# Patient Record
Sex: MALE | Race: WHITE | Employment: OTHER | URBAN - METROPOLITAN AREA
[De-identification: names, ages, dates, MRNs, and addresses within clinical notes are randomized per-mention and may not be internally consistent; named-entity substitution may affect disease eponyms.]

---

## 2020-09-11 ENCOUNTER — HOSPITAL ENCOUNTER (OUTPATIENT)
Age: 65
Discharge: HOME | End: 2020-09-11
Payer: MEDICARE

## 2020-09-11 PROCEDURE — 84153 ASSAY OF PSA TOTAL: CPT

## 2020-09-11 PROCEDURE — 36415 COLL VENOUS BLD VENIPUNCTURE: CPT

## 2020-09-12 LAB — PROSTATE SPECIFIC ANTIGEN: 3.41 NG/ML (ref 0–4)

## 2020-09-28 ENCOUNTER — TELEPHONE (OUTPATIENT)
Dept: FAMILY MEDICINE CLINIC | Age: 65
End: 2020-09-28

## 2020-09-28 NOTE — TELEPHONE ENCOUNTER
Patients wife calling today stating patient has a horrible sinus infection and they are leaving for [place] in the morning. I advised express care and they said they didn't want to come into office d/t covid. I can not find any record or past office visit. Are you willing to call in script? Please advise.

## 2021-07-01 NOTE — TELEPHONE ENCOUNTER
Has not been seen since Dec 2016, wife advised patient needs seen, dr will not call in a script pregnant

## 2023-01-04 RX ORDER — MAGNESIUM 30 MG
30 TABLET ORAL DAILY
COMMUNITY

## 2023-01-04 RX ORDER — SACUBITRIL AND VALSARTAN 49; 51 MG/1; MG/1
1 TABLET, FILM COATED ORAL 2 TIMES DAILY
COMMUNITY

## 2023-01-04 RX ORDER — VITAMIN B COMPLEX
1 CAPSULE ORAL DAILY
COMMUNITY

## 2023-01-04 RX ORDER — EPLERENONE 25 MG/1
25 TABLET, FILM COATED ORAL DAILY
COMMUNITY

## 2023-01-04 RX ORDER — MULTIVIT WITH MINERALS/LUTEIN
250 TABLET ORAL DAILY
COMMUNITY

## 2023-01-04 NOTE — PROGRESS NOTES
[facility] PRE-ADMISSION TESTING INSTRUCTIONS    The Preadmission Testing patient is instructed accordingly using the following criteria (check applicable):    ARRIVAL INSTRUCTIONS:  [x] Parking the day of Surgery is located in the Main Entrance lot. Upon entering the door, make an immediate right to the surgery reception desk    [x] Bring photo ID and insurance card    [x] Bring in a copy of Living will or Durable Power of  papers. [x] Please be sure to arrange transportation to and from the hospital    [x] Please arrange for someone to be with you the remainder of the day due to having anesthesia      GENERAL INSTRUCTIONS:    [x] Nothing by mouth after midnight, including gum, candy, mints or water    [x] You may brush your teeth, but do not swallow any water    [x] Take medications as instructed with 1-2 oz of water    [x] Stop herbal supplements and vitamins 5 days prior to procedure    [x] Follow preop dosing of blood thinners per physician instructions    [] Do not take insulin or oral diabetic medications    [] If diabetic and have low blood sugar or feel symptomatic, take 1-2oz apple juice or glucose tablets    [] Bring inhalers day of surgery    [] Bring C-PAP/ Bi-Pap day of surgery    [] Bring urine specimen day of surgery    [x] Antibacterial Soap shower or bath AM of Surgery, no lotion, powders or creams to surgical site    [] Follow bowel prep as instructed per surgeon    [x] No tobacco products within 24 hours of surgery     [x] No alcohol or illegal drug use within 24 hours of surgery.     [x] Jewelry, body piercing's, eyeglasses, contact lenses and dentures are not permitted into surgery (bring cases)      [] Please do not wear any nail polish or make up on the day of surgery    [] If not already done, you can expect a call from registration    [x] If surgeon requests a time change you will be notified the day prior to surgery    [] If you receive a survey after surgery we would greatly appreciate your comments    [] Parent/guardian of a minor must accompany their child and remain on the premises  the entire time they are under our care     [] Pediatric patients may bring favorite toy, blanket or comfort item with them    [] A caregiver or family member must remain with the patient during their stay if they are mentally handicapped, have dementia, disoriented or unable to use a call light or would be a safety concern if left unattended    [x] Please notify surgeon if you develop any illness between now and time of surgery (cold, cough, sore throat, fever, nausea, vomiting) or any signs of infections  including skin, wounds, and dental.    [] Other instructions    EDUCATIONAL MATERIALS PROVIDED:    [] PAT Preoperative Education Packet/Booklet     [] Medication List    [] Fluoroscopy Information Pamphlet    [] Transfusion bracelet applied with instructions    [] Joint replacement video reviewed    [] Shower with antibacterial soap and use CHG wipes provided the evening before surgery as instructed

## 2023-01-05 NOTE — PROGRESS NOTES
MORTEZA Rowley R.N. reviewed with Dr. Ej Goodman on 1/4/23 pt cardiac history and that he follows with , cardiologist at [facility] his last visit was 3/2022. Dr Ej Goodman stated that Celester Host need to retreive the ov notes from 3/22 sp she could review, and if she could not get them, pt would need cardiac and medical clearance. .. Today, [facility] Ov notes were easily obtained from care everywhere and reviewed with Dr. Glenys Michel, Anesthesiologist who is requesting that pt get cardiac clearance for upcomigTURP surgery.  Arleth at Dr. Laverne Wynne office notified and will call pt and sent clearance request to cardiologist.

## 2023-02-02 RX ORDER — MULTIVIT-MIN/IRON/FOLIC ACID/K 18-600-40
CAPSULE ORAL
COMMUNITY

## 2023-02-02 NOTE — PROGRESS NOTES
[facility] PRE-ADMISSION TESTING INSTRUCTIONS    The Preadmission Testing patient is instructed accordingly using the following criteria (check applicable):    ARRIVAL INSTRUCTIONS:  [x] Parking the day of Surgery is located in the Main Entrance lot. Upon entering the door, make an immediate right to the surgery reception desk    [x] Bring photo ID and insurance card    [] Bring in a copy of Living will or Durable Power of  papers. [x] Please be sure to arrange for responsible adult to provide transportation to and from the hospital    [x] Please arrange for responsible adult to be with you for the 24 hour period post procedure due to having anesthesia    [x] If you awake am of surgery not feeling well or have temperature >100 please call [phone]    GENERAL INSTRUCTIONS:    [x] Nothing by mouth after midnight, including gum, candy, mints or water    [x] You may brush your teeth, but do not swallow any water    [x] Take medications as instructed with 1-2 oz of water    [x] Stop herbal supplements and vitamins 5 days prior to procedure    [] Follow preop dosing of blood thinners per physician instructions    [] Take 1/2 dose of evening insulin, but no insulin after midnight    [] No oral diabetic medications after midnight    [] If diabetic and have low blood sugar or feel symptomatic, take 1-2oz apple juice only    [] Bring inhalers day of surgery    [] Bring C-PAP/ Bi-Pap day of surgery    [] Bring urine specimen day of surgery    [x] Shower or bath with soap, lather and rinse well, AM of Surgery, no lotion, powders or creams to surgical site    [] Follow bowel prep as instructed per surgeon    [x] No tobacco products within 24 hours of surgery     [x] No alcohol or illegal drug use within 24 hours of surgery.     [x] Jewelry, body piercing's, eyeglasses, contact lenses and dentures are not permitted into surgery (bring cases)      [x] Please do not wear any nail polish, make up or hair products on the day of surgery    [x] You can expect a call the business day prior to procedure to notify you if your arrival time changes    [x] If you receive a survey after surgery we would greatly appreciate your comments    [] Parent/guardian of a minor must accompany their child and remain on the premises  the entire time they are under our care     [] Pediatric patients may bring favorite toy, blanket or comfort item with them    [] A caregiver or family member must remain with the patient during their stay if they are mentally handicapped, have dementia, disoriented or unable to use a call light or would be a safety concern if left unattended    [x] Please notify surgeon if you develop any illness between now and time of surgery (cold, cough, sore throat, fever, nausea, vomiting) or any signs of infections  including skin, wounds, and dental.    [x]  The Outpatient Pharmacy is available to fill your prescription here on your day of surgery, ask your preop nurse for details    [] Other instructions    EDUCATIONAL MATERIALS PROVIDED:    [] PAT Preoperative Education Packet/Booklet     [] Medication List    [] Transfusion bracelet applied with instructions    [] Shower with soap, lather and rinse well, and use CHG wipes provided the evening before surgery as instructed    [] Incentive spirometer with instructions

## 2023-02-02 NOTE — PROGRESS NOTES
Cardiac clearance and office notes not on chart. [facility] at Dr. Norberto Tavarez office notified.

## 2023-02-03 ENCOUNTER — PREP FOR PROCEDURE (OUTPATIENT)
Dept: UROLOGY | Age: 68
End: 2023-02-03

## 2023-02-03 DIAGNOSIS — Z90.79 S/P TURP: Primary | ICD-10-CM

## 2023-02-03 RX ORDER — SODIUM CHLORIDE 0.9 % (FLUSH) 0.9 %
5-40 SYRINGE (ML) INJECTION PRN
Status: CANCELLED | OUTPATIENT
Start: 2023-02-03

## 2023-02-03 RX ORDER — SODIUM CHLORIDE 9 MG/ML
INJECTION, SOLUTION INTRAVENOUS CONTINUOUS
Status: CANCELLED | OUTPATIENT
Start: 2023-02-03

## 2023-02-03 RX ORDER — SODIUM CHLORIDE 9 MG/ML
INJECTION, SOLUTION INTRAVENOUS PRN
Status: CANCELLED | OUTPATIENT
Start: 2023-02-03

## 2023-02-03 RX ORDER — SODIUM CHLORIDE 0.9 % (FLUSH) 0.9 %
5-40 SYRINGE (ML) INJECTION EVERY 12 HOURS SCHEDULED
Status: CANCELLED | OUTPATIENT
Start: 2023-02-03

## 2023-02-03 NOTE — PROGRESS NOTES
Left voice message for Arleth: still need the cardiac clearance as requested on 1/5/2023 by Sarah Reyes RN for Dr Miriam Bridges, Anesthesiologist. (See Progress note)

## 2023-02-05 ENCOUNTER — ANESTHESIA EVENT (OUTPATIENT)
Dept: OPERATING ROOM | Age: 68
End: 2023-02-05
Payer: MEDICARE

## 2023-02-06 ENCOUNTER — HOSPITAL ENCOUNTER (EMERGENCY)
Age: 68
Discharge: HOME | End: 2023-02-06
Attending: EMERGENCY MEDICINE
Payer: MEDICARE

## 2023-02-06 ENCOUNTER — ANESTHESIA (OUTPATIENT)
Dept: OPERATING ROOM | Age: 68
End: 2023-02-06
Payer: MEDICARE

## 2023-02-06 ENCOUNTER — APPOINTMENT (OUTPATIENT)
Dept: GENERAL RADIOLOGY | Age: 68
End: 2023-02-06
Attending: UROLOGY
Payer: MEDICARE

## 2023-02-06 ENCOUNTER — HOSPITAL ENCOUNTER (OUTPATIENT)
Age: 68
Setting detail: OUTPATIENT SURGERY
Discharge: HOME | End: 2023-02-06
Attending: UROLOGY | Admitting: UROLOGY
Payer: MEDICARE

## 2023-02-06 VITALS
DIASTOLIC BLOOD PRESSURE: 88 MMHG | WEIGHT: 180 LBS | OXYGEN SATURATION: 100 % | HEIGHT: 69 IN | RESPIRATION RATE: 16 BRPM | HEART RATE: 78 BPM | BODY MASS INDEX: 26.66 KG/M2 | SYSTOLIC BLOOD PRESSURE: 125 MMHG | TEMPERATURE: 98.4 F

## 2023-02-06 VITALS
SYSTOLIC BLOOD PRESSURE: 128 MMHG | HEIGHT: 69 IN | WEIGHT: 180 LBS | HEART RATE: 72 BPM | OXYGEN SATURATION: 96 % | RESPIRATION RATE: 16 BRPM | TEMPERATURE: 97.5 F | BODY MASS INDEX: 26.66 KG/M2 | DIASTOLIC BLOOD PRESSURE: 74 MMHG

## 2023-02-06 DIAGNOSIS — G89.18 POST-OPERATIVE PAIN: Primary | ICD-10-CM

## 2023-02-06 DIAGNOSIS — Z90.79 S/P TURP (STATUS POST TRANSURETHRAL RESECTION OF PROSTATE): Primary | ICD-10-CM

## 2023-02-06 DIAGNOSIS — N13.8 ENLARGED PROSTATE WITH URINARY OBSTRUCTION: ICD-10-CM

## 2023-02-06 DIAGNOSIS — N40.1 ENLARGED PROSTATE WITH URINARY OBSTRUCTION: ICD-10-CM

## 2023-02-06 LAB
ANION GAP SERPL CALCULATED.3IONS-SCNC: 10 MMOL/L (ref 7–16)
BUN BLDV-MCNC: 22 MG/DL (ref 6–23)
CALCIUM SERPL-MCNC: 9.4 MG/DL (ref 8.6–10.2)
CHLORIDE BLD-SCNC: 104 MMOL/L (ref 98–107)
CO2: 25 MMOL/L (ref 22–29)
CREAT SERPL-MCNC: 1.1 MG/DL (ref 0.7–1.2)
EKG ATRIAL RATE: 62 BPM
EKG P AXIS: 63 DEGREES
EKG P-R INTERVAL: 148 MS
EKG Q-T INTERVAL: 424 MS
EKG QRS DURATION: 126 MS
EKG QTC CALCULATION (BAZETT): 430 MS
EKG R AXIS: -61 DEGREES
EKG T AXIS: 0 DEGREES
EKG VENTRICULAR RATE: 62 BPM
GFR SERPL CREATININE-BSD FRML MDRD: >60 ML/MIN/1.73
GLUCOSE BLD-MCNC: 104 MG/DL (ref 74–99)
HCT VFR BLD CALC: 49.4 % (ref 37–54)
HEMOGLOBIN: 16.4 G/DL (ref 12.5–16.5)
MCH RBC QN AUTO: 32.3 PG (ref 26–35)
MCHC RBC AUTO-ENTMCNC: 33.2 % (ref 32–34.5)
MCV RBC AUTO: 97.2 FL (ref 80–99.9)
PDW BLD-RTO: 13.8 FL (ref 11.5–15)
PLATELET # BLD: 198 E9/L (ref 130–450)
PMV BLD AUTO: 9.8 FL (ref 7–12)
POTASSIUM SERPL-SCNC: 4.1 MMOL/L (ref 3.5–5)
RBC # BLD: 5.08 E12/L (ref 3.8–5.8)
SODIUM BLD-SCNC: 139 MMOL/L (ref 132–146)
WBC # BLD: 7.8 E9/L (ref 4.5–11.5)

## 2023-02-06 PROCEDURE — 7100000010 HC PHASE II RECOVERY - FIRST 15 MIN: Performed by: UROLOGY

## 2023-02-06 PROCEDURE — 6360000002 HC RX W HCPCS: Performed by: ANESTHESIOLOGY

## 2023-02-06 PROCEDURE — 7100000001 HC PACU RECOVERY - ADDTL 15 MIN: Performed by: UROLOGY

## 2023-02-06 PROCEDURE — 3600000013 HC SURGERY LEVEL 3 ADDTL 15MIN: Performed by: UROLOGY

## 2023-02-06 PROCEDURE — 2580000003 HC RX 258: Performed by: NURSE PRACTITIONER

## 2023-02-06 PROCEDURE — 80048 BASIC METABOLIC PNL TOTAL CA: CPT

## 2023-02-06 PROCEDURE — 6360000002 HC RX W HCPCS

## 2023-02-06 PROCEDURE — 6360000002 HC RX W HCPCS: Performed by: NURSE ANESTHETIST, CERTIFIED REGISTERED

## 2023-02-06 PROCEDURE — 36415 COLL VENOUS BLD VENIPUNCTURE: CPT

## 2023-02-06 PROCEDURE — 6360000002 HC RX W HCPCS: Performed by: NURSE PRACTITIONER

## 2023-02-06 PROCEDURE — 6370000000 HC RX 637 (ALT 250 FOR IP)

## 2023-02-06 PROCEDURE — 88305 TISSUE EXAM BY PATHOLOGIST: CPT

## 2023-02-06 PROCEDURE — 74420 UROGRAPHY RTRGR +-KUB: CPT

## 2023-02-06 PROCEDURE — 7100000000 HC PACU RECOVERY - FIRST 15 MIN: Performed by: UROLOGY

## 2023-02-06 PROCEDURE — 7100000011 HC PHASE II RECOVERY - ADDTL 15 MIN: Performed by: UROLOGY

## 2023-02-06 PROCEDURE — 3600000003 HC SURGERY LEVEL 3 BASE: Performed by: UROLOGY

## 2023-02-06 PROCEDURE — 88342 IMHCHEM/IMCYTCHM 1ST ANTB: CPT

## 2023-02-06 PROCEDURE — 2720000010 HC SURG SUPPLY STERILE: Performed by: UROLOGY

## 2023-02-06 PROCEDURE — 2709999900 HC NON-CHARGEABLE SUPPLY: Performed by: UROLOGY

## 2023-02-06 PROCEDURE — C1758 CATHETER, URETERAL: HCPCS | Performed by: UROLOGY

## 2023-02-06 PROCEDURE — 2500000003 HC RX 250 WO HCPCS: Performed by: NURSE ANESTHETIST, CERTIFIED REGISTERED

## 2023-02-06 PROCEDURE — 93005 ELECTROCARDIOGRAM TRACING: CPT

## 2023-02-06 PROCEDURE — 3700000000 HC ANESTHESIA ATTENDED CARE: Performed by: UROLOGY

## 2023-02-06 PROCEDURE — 6360000004 HC RX CONTRAST MEDICATION: Performed by: UROLOGY

## 2023-02-06 PROCEDURE — 3700000001 HC ADD 15 MINUTES (ANESTHESIA): Performed by: UROLOGY

## 2023-02-06 PROCEDURE — 85027 COMPLETE CBC AUTOMATED: CPT

## 2023-02-06 PROCEDURE — 6370000000 HC RX 637 (ALT 250 FOR IP): Performed by: ANESTHESIOLOGY

## 2023-02-06 RX ORDER — SODIUM CHLORIDE 0.9 % (FLUSH) 0.9 %
5-40 SYRINGE (ML) INJECTION PRN
Status: DISCONTINUED | OUTPATIENT
Start: 2023-02-06 | End: 2023-02-06 | Stop reason: HOSPADM

## 2023-02-06 RX ORDER — FENTANYL CITRATE 50 UG/ML
INJECTION, SOLUTION INTRAMUSCULAR; INTRAVENOUS PRN
Status: DISCONTINUED | OUTPATIENT
Start: 2023-02-06 | End: 2023-02-06 | Stop reason: SDUPTHER

## 2023-02-06 RX ORDER — CEPHALEXIN 250 MG/1
250 CAPSULE ORAL 3 TIMES DAILY
Qty: 9 CAPSULE | Refills: 0 | Status: SHIPPED | OUTPATIENT
Start: 2023-02-06 | End: 2023-02-09

## 2023-02-06 RX ORDER — SODIUM CHLORIDE 9 MG/ML
INJECTION, SOLUTION INTRAVENOUS PRN
Status: DISCONTINUED | OUTPATIENT
Start: 2023-02-06 | End: 2023-02-06 | Stop reason: HOSPADM

## 2023-02-06 RX ORDER — SODIUM CHLORIDE 0.9 % (FLUSH) 0.9 %
5-40 SYRINGE (ML) INJECTION EVERY 12 HOURS SCHEDULED
Status: DISCONTINUED | OUTPATIENT
Start: 2023-02-06 | End: 2023-02-06 | Stop reason: HOSPADM

## 2023-02-06 RX ORDER — GLYCOPYRROLATE 0.2 MG/ML
INJECTION INTRAMUSCULAR; INTRAVENOUS PRN
Status: DISCONTINUED | OUTPATIENT
Start: 2023-02-06 | End: 2023-02-06 | Stop reason: SDUPTHER

## 2023-02-06 RX ORDER — ACETAMINOPHEN AND CODEINE PHOSPHATE 300; 30 MG/1; MG/1
1 TABLET ORAL ONCE
Status: COMPLETED | OUTPATIENT
Start: 2023-02-06 | End: 2023-02-06

## 2023-02-06 RX ORDER — SENNA PLUS 8.6 MG/1
1 TABLET ORAL 2 TIMES DAILY
Qty: 20 TABLET | Refills: 0 | Status: SHIPPED | OUTPATIENT
Start: 2023-02-06 | End: 2023-02-16

## 2023-02-06 RX ORDER — PROPOFOL 10 MG/ML
INJECTION, EMULSION INTRAVENOUS PRN
Status: DISCONTINUED | OUTPATIENT
Start: 2023-02-06 | End: 2023-02-06 | Stop reason: SDUPTHER

## 2023-02-06 RX ORDER — MEPERIDINE HYDROCHLORIDE 25 MG/ML
12.5 INJECTION INTRAMUSCULAR; INTRAVENOUS; SUBCUTANEOUS ONCE
Status: COMPLETED | OUTPATIENT
Start: 2023-02-06 | End: 2023-02-06

## 2023-02-06 RX ORDER — LIDOCAINE HYDROCHLORIDE 20 MG/ML
INJECTION, SOLUTION EPIDURAL; INFILTRATION; INTRACAUDAL; PERINEURAL PRN
Status: DISCONTINUED | OUTPATIENT
Start: 2023-02-06 | End: 2023-02-06 | Stop reason: SDUPTHER

## 2023-02-06 RX ORDER — HYDROCODONE BITARTRATE AND ACETAMINOPHEN 5; 325 MG/1; MG/1
1 TABLET ORAL ONCE
Status: COMPLETED | OUTPATIENT
Start: 2023-02-06 | End: 2023-02-06

## 2023-02-06 RX ORDER — ESMOLOL HYDROCHLORIDE 10 MG/ML
INJECTION INTRAVENOUS PRN
Status: DISCONTINUED | OUTPATIENT
Start: 2023-02-06 | End: 2023-02-06 | Stop reason: SDUPTHER

## 2023-02-06 RX ORDER — METOCLOPRAMIDE HYDROCHLORIDE 5 MG/ML
10 INJECTION INTRAMUSCULAR; INTRAVENOUS ONCE
Status: DISCONTINUED | OUTPATIENT
Start: 2023-02-06 | End: 2023-02-06 | Stop reason: HOSPADM

## 2023-02-06 RX ORDER — LABETALOL HYDROCHLORIDE 5 MG/ML
5 INJECTION, SOLUTION INTRAVENOUS
Status: DISCONTINUED | OUTPATIENT
Start: 2023-02-06 | End: 2023-02-06 | Stop reason: HOSPADM

## 2023-02-06 RX ORDER — NEOSTIGMINE METHYLSULFATE 1 MG/ML
INJECTION, SOLUTION INTRAVENOUS PRN
Status: DISCONTINUED | OUTPATIENT
Start: 2023-02-06 | End: 2023-02-06 | Stop reason: SDUPTHER

## 2023-02-06 RX ORDER — HYDROCODONE BITARTRATE AND ACETAMINOPHEN 5; 325 MG/1; MG/1
1-2 TABLET ORAL EVERY 6 HOURS PRN
Qty: 10 TABLET | Refills: 0 | Status: SHIPPED | OUTPATIENT
Start: 2023-02-06 | End: 2023-02-09

## 2023-02-06 RX ORDER — HYDRALAZINE HYDROCHLORIDE 20 MG/ML
5 INJECTION INTRAMUSCULAR; INTRAVENOUS
Status: DISCONTINUED | OUTPATIENT
Start: 2023-02-06 | End: 2023-02-06 | Stop reason: HOSPADM

## 2023-02-06 RX ORDER — DEXAMETHASONE SODIUM PHOSPHATE 4 MG/ML
INJECTION, SOLUTION INTRA-ARTICULAR; INTRALESIONAL; INTRAMUSCULAR; INTRAVENOUS; SOFT TISSUE PRN
Status: DISCONTINUED | OUTPATIENT
Start: 2023-02-06 | End: 2023-02-06 | Stop reason: SDUPTHER

## 2023-02-06 RX ORDER — PROCHLORPERAZINE EDISYLATE 5 MG/ML
5 INJECTION INTRAMUSCULAR; INTRAVENOUS
Status: DISCONTINUED | OUTPATIENT
Start: 2023-02-06 | End: 2023-02-06 | Stop reason: HOSPADM

## 2023-02-06 RX ORDER — ONDANSETRON 2 MG/ML
INJECTION INTRAMUSCULAR; INTRAVENOUS PRN
Status: DISCONTINUED | OUTPATIENT
Start: 2023-02-06 | End: 2023-02-06 | Stop reason: SDUPTHER

## 2023-02-06 RX ORDER — PYRAZINAMIDE 500 MG/1
1-2 TABLET ORAL EVERY 6 HOURS PRN
Qty: 10 TABLET | Refills: 0 | Status: SHIPPED | OUTPATIENT
Start: 2023-02-06 | End: 2023-02-13

## 2023-02-06 RX ORDER — ROCURONIUM BROMIDE 10 MG/ML
INJECTION, SOLUTION INTRAVENOUS PRN
Status: DISCONTINUED | OUTPATIENT
Start: 2023-02-06 | End: 2023-02-06 | Stop reason: SDUPTHER

## 2023-02-06 RX ORDER — DIPHENHYDRAMINE HYDROCHLORIDE 50 MG/ML
12.5 INJECTION INTRAMUSCULAR; INTRAVENOUS
Status: DISCONTINUED | OUTPATIENT
Start: 2023-02-06 | End: 2023-02-06 | Stop reason: HOSPADM

## 2023-02-06 RX ORDER — MIDAZOLAM HYDROCHLORIDE 1 MG/ML
INJECTION INTRAMUSCULAR; INTRAVENOUS PRN
Status: DISCONTINUED | OUTPATIENT
Start: 2023-02-06 | End: 2023-02-06 | Stop reason: SDUPTHER

## 2023-02-06 RX ORDER — SODIUM CHLORIDE 9 MG/ML
INJECTION, SOLUTION INTRAVENOUS CONTINUOUS
Status: DISCONTINUED | OUTPATIENT
Start: 2023-02-06 | End: 2023-02-06 | Stop reason: HOSPADM

## 2023-02-06 RX ORDER — FENTANYL CITRATE 50 UG/ML
25 INJECTION, SOLUTION INTRAMUSCULAR; INTRAVENOUS EVERY 5 MIN PRN
Status: DISCONTINUED | OUTPATIENT
Start: 2023-02-06 | End: 2023-02-06 | Stop reason: HOSPADM

## 2023-02-06 RX ADMIN — MIDAZOLAM 2 MG: 1 INJECTION INTRAMUSCULAR; INTRAVENOUS at 06:57

## 2023-02-06 RX ADMIN — HYDROMORPHONE HYDROCHLORIDE 0.5 MG: 1 INJECTION, SOLUTION INTRAMUSCULAR; INTRAVENOUS; SUBCUTANEOUS at 09:42

## 2023-02-06 RX ADMIN — PHENYLEPHRINE HYDROCHLORIDE 100 MCG: 10 INJECTION INTRAVENOUS at 07:40

## 2023-02-06 RX ADMIN — PROPOFOL 40 MG: 10 INJECTION, EMULSION INTRAVENOUS at 08:35

## 2023-02-06 RX ADMIN — GLYCOPYRROLATE 0.6 MG: 0.2 INJECTION, SOLUTION INTRAMUSCULAR; INTRAVENOUS at 08:49

## 2023-02-06 RX ADMIN — ROCURONIUM BROMIDE 40 MG: 50 INJECTION INTRAVENOUS at 07:08

## 2023-02-06 RX ADMIN — PHENYLEPHRINE HYDROCHLORIDE 100 MCG: 10 INJECTION INTRAVENOUS at 07:28

## 2023-02-06 RX ADMIN — NEOSTIGMINE METHYLSULFATE 3 MG: 1 INJECTION, SOLUTION INTRAVENOUS at 08:49

## 2023-02-06 RX ADMIN — FENTANYL CITRATE 100 MCG: 50 INJECTION, SOLUTION INTRAMUSCULAR; INTRAVENOUS at 07:08

## 2023-02-06 RX ADMIN — ESMOLOL HYDROCHLORIDE 20 MG: 10 INJECTION, SOLUTION INTRAVENOUS at 07:08

## 2023-02-06 RX ADMIN — SODIUM CHLORIDE: 9 INJECTION, SOLUTION INTRAVENOUS at 06:56

## 2023-02-06 RX ADMIN — PROPOFOL 80 MG: 10 INJECTION, EMULSION INTRAVENOUS at 07:08

## 2023-02-06 RX ADMIN — PHENYLEPHRINE HYDROCHLORIDE 100 MCG: 10 INJECTION INTRAVENOUS at 07:15

## 2023-02-06 RX ADMIN — PHENYLEPHRINE HYDROCHLORIDE 100 MCG: 10 INJECTION INTRAVENOUS at 08:50

## 2023-02-06 RX ADMIN — WATER 2000 MG: 1 INJECTION INTRAMUSCULAR; INTRAVENOUS; SUBCUTANEOUS at 07:14

## 2023-02-06 RX ADMIN — HYDROCODONE BITARTRATE AND ACETAMINOPHEN 1 TABLET: 5; 325 TABLET ORAL at 19:50

## 2023-02-06 RX ADMIN — ONDANSETRON 4 MG: 2 INJECTION INTRAMUSCULAR; INTRAVENOUS at 08:42

## 2023-02-06 RX ADMIN — LIDOCAINE HYDROCHLORIDE 100 MG: 20 INJECTION, SOLUTION EPIDURAL; INFILTRATION; INTRACAUDAL; PERINEURAL at 07:08

## 2023-02-06 RX ADMIN — GLYCOPYRROLATE 0.2 MG: 0.2 INJECTION, SOLUTION INTRAMUSCULAR; INTRAVENOUS at 07:04

## 2023-02-06 RX ADMIN — MEPERIDINE HYDROCHLORIDE 12.5 MG: 25 INJECTION, SOLUTION INTRAMUSCULAR; INTRAVENOUS; SUBCUTANEOUS at 09:41

## 2023-02-06 RX ADMIN — ACETAMINOPHEN AND CODEINE PHOSPHATE 1 TABLET: 300; 30 TABLET ORAL at 10:53

## 2023-02-06 RX ADMIN — DEXAMETHASONE SODIUM PHOSPHATE 10 MG: 4 INJECTION, SOLUTION INTRAMUSCULAR; INTRAVENOUS at 07:17

## 2023-02-06 ASSESSMENT — PAIN DESCRIPTION - LOCATION
LOCATION: ABDOMEN;GROIN
LOCATION: ABDOMEN
LOCATION: PENIS
LOCATION: ABDOMEN

## 2023-02-06 ASSESSMENT — PAIN DESCRIPTION - ORIENTATION
ORIENTATION: RIGHT
ORIENTATION: ANTERIOR

## 2023-02-06 ASSESSMENT — PAIN DESCRIPTION - DESCRIPTORS
DESCRIPTORS: PRESSURE
DESCRIPTORS: PRESSURE

## 2023-02-06 ASSESSMENT — PAIN DESCRIPTION - FREQUENCY: FREQUENCY: CONTINUOUS

## 2023-02-06 ASSESSMENT — PAIN DESCRIPTION - PAIN TYPE: TYPE: ACUTE PAIN

## 2023-02-06 ASSESSMENT — PAIN - FUNCTIONAL ASSESSMENT
PAIN_FUNCTIONAL_ASSESSMENT: 0-10
PAIN_FUNCTIONAL_ASSESSMENT: 0-10
PAIN_FUNCTIONAL_ASSESSMENT: PREVENTS OR INTERFERES SOME ACTIVE ACTIVITIES AND ADLS

## 2023-02-06 ASSESSMENT — PAIN SCALES - GENERAL
PAINLEVEL_OUTOF10: 9
PAINLEVEL_OUTOF10: 5
PAINLEVEL_OUTOF10: 7
PAINLEVEL_OUTOF10: 7

## 2023-02-06 ASSESSMENT — PAIN DESCRIPTION - ONSET: ONSET: ON-GOING

## 2023-02-06 ASSESSMENT — LIFESTYLE VARIABLES: SMOKING_STATUS: 0

## 2023-02-06 NOTE — ANESTHESIA PRE PROCEDURE
Department of Anesthesiology  Preprocedure Note       Name:  Annamarie Barker   Age:  79 y.o.  :  1955                                          MRN:  [id number]         Date:  2023      Surgeon: Martell Hart):  Bety Hawkins MD    Procedure: Procedure(s):  CYSTOSCOPY RETROGRADE PYELOGRAM PLASMA LOOP TRANURETHERAL RESECTION OF PROSTATE    Medications prior to admission:   Prior to Admission medications    Medication Sig Start Date End Date Taking? Authorizing Provider   Cholecalciferol (VITAMIN D) 50 MCG (2000) CAPS capsule Take by mouth   Yes Historical Provider, MD   sacubitril-valsartan (ENTRESTO) 49-51 MG per tablet Take 1 tablet by mouth 2 times daily   Yes Historical Provider, MD   b complex vitamins capsule Take 1 capsule by mouth daily   Yes Historical Provider, MD   eplerenone (INSPRA) 25 MG tablet Take 25 mg by mouth daily DIURETIC   Yes Historical Provider, MD   Ascorbic Acid (VITAMIN C) 250 MG tablet Take 250 mg by mouth daily   Yes Historical Provider, MD   [facility] ([address] [place] [address]) Take by mouth   Yes Historical Provider, MD   magnesium 30 MG tablet Take 30 mg by mouth daily   Yes Historical Provider, MD   Nutritional Supplements (CHOLESTEROL DEFENSE PO) Take by mouth daily   Yes Historical Provider, MD   COD LIVER OIL PO Take by mouth daily   Yes Historical Provider, MD   metoprolol succinate (TOPROL XL) 50 MG extended release tablet Take 1 tablet by mouth 2 times daily  Patient taking differently: Take 100 mg by mouth in the morning and 100 mg in the evening.  17   Radha Gannon MD       Current medications:    Current Facility-Administered Medications   Medication Dose Route Frequency Provider Last Rate Last Admin    0.9 % sodium chloride infusion   IntraVENous Continuous MARY LOU Gregorio CNP        sodium chloride flush 0.9 % injection 5-40 mL  5-40 mL IntraVENous 2 times per day MARY LOU Gregorio CNP        sodium chloride flush 0.9 % injection 5-40 mL  5-40 mL IntraVENous PRN MARY LOU Gregorio CNP        0.9 % sodium chloride infusion   IntraVENous PRN MARY LOU Gregorio CNP        ceFAZolin (ANCEF) 2,000 mg in sterile water 20 mL IV syringe  2,000 mg IntraVENous On Call to [address], APRN - CNP           Allergies: Allergies   Allergen Reactions    Atorvastatin Myalgia    Lisinopril Cough    Spironolactone        Problem List:  There is no problem list on file for this patient. Past Medical History:        Diagnosis Date    Asthma     [facility]     CHF (congestive heart failure) ([facility] 2017     [facility]    Hypertension     Restricted diet     KOSHER       Past Surgical History:        Procedure Laterality Date    COLONOSCOPY      HERNIA REPAIR         Social History:    Social History     Tobacco Use    Smoking status: Former     Types: Cigarettes    Smokeless tobacco: Never   Substance Use Topics    Alcohol use: Yes     Comment: occasionally                                Counseling given: Not Answered      Vital Signs (Current):   Vitals:    01/04/23 1435 02/02/23 1445 02/06/23 0528   BP:   139/84   Pulse:   60   Resp:   16   Temp:   96.8 °F (36 °C)   TempSrc:   Temporal   SpO2:   97%   Weight: 180 lb (81.6 kg) 180 lb (81.6 kg)    Height: 5' 9\" (1.753 m) 5' 9\" (1.753 m)                                               BP Readings from Last 3 Encounters:   02/06/23 139/84   03/22/17 122/82   02/15/17 116/80       NPO Status: Time of last liquid consumption: 2200                        Time of last solid consumption: 1830                        Date of last liquid consumption: 02/05/23                        Date of last solid food consumption: 02/05/23    BMI:   Wt Readings from Last 3 Encounters:   02/02/23 180 lb (81.6 kg)   03/22/17 188 lb (85.3 kg)   02/15/17 180 lb (81.6 kg)     Body mass index is 26.58 kg/m².     CBC:   Lab Results   Component Value Date/Time    WBC 7.8 02/06/2023 05:35 AM    RBC 5.08 02/06/2023 05:35 AM    HGB 16.4 02/06/2023 05:35 AM    HCT 49.4 02/06/2023 05:35 AM    MCV 97.2 02/06/2023 05:35 AM    RDW 13.8 02/06/2023 05:35 AM     02/06/2023 05:35 AM       CMP:   Lab Results   Component Value Date/Time     02/06/2023 05:35 AM    K 4.1 02/06/2023 05:35 AM     02/06/2023 05:35 AM    CO2 25 02/06/2023 05:35 AM    BUN 22 02/06/2023 05:35 AM    CREATININE 1.1 02/06/2023 05:35 AM    LABGLOM >60 02/06/2023 05:35 AM    GLUCOSE 104 02/06/2023 05:35 AM    GLUCOSE 128 02/19/2011 05:00 AM    CALCIUM 9.4 02/06/2023 05:35 AM       POC Tests: No results for input(s): POCGLU, POCNA, POCK, POCCL, POCBUN, POCHEMO, POCHCT in the last 72 hours.     Coags: No results found for: PROTIME, INR, APTT    HCG (If Applicable): No results found for: PREGTESTUR, PREGSERUM, HCG, HCGQUANT     ABGs: No results found for: PHART, PO2ART, UVO6ROO, ECK2YIF, BEART, V9TCCLCA     Type & Screen (If Applicable):  No results found for: LABABO, LABRH    Drug/Infectious Status (If Applicable):  No results found for: HIV, HEPCAB    COVID-19 Screening (If Applicable): No results found for: COVID19        Anesthesia Evaluation  Patient summary reviewed and Nursing notes reviewed no history of anesthetic complications:   Airway:           Dental:          Pulmonary:   (+) asthma (Mild intermittent):     (-) not a current smoker (Former)                           Cardiovascular:  Exercise tolerance: good (>4 METS),   (+) hypertension: no interval change and moderate, valvular problems/murmurs: MR and AI, CAD (LVEF 20-25%):, CHF:, pulmonary hypertension (RVSP 78 mm Hg): severe,       ECG reviewed      Echocardiogram reviewed         Beta Blocker:  Dose within 24 Hrs      ROS comment: EKG:  Normal sinus rhythm  Left axis deviation  Nonspecific intraventricular block  Abnormal ECG  No previous ECGs available    ECHO 2017:  Ejection fraction is visually estimated at 20-25%. Overall ejection fraction severely decreased. Left ventricle is mildly enlarged. There is doppler evidence of stage III diastolic dysfunction. Normal right ventricle size. Normal RV function. TAPSE is 1.7 cm. Left atrial volume index of 35 ml per meters squared BSA. Trace aortic valve regurgitation. Moderate mitral regurgitation is present. ERO is 0.2 cm2. Mild tricuspid regurgitation. Pulmonary hypertension is severe. RVSP is 78 mmHg. Per cardiology note 2017: This 66-year-old man has a history of nonischemic dilated cardiomyopathy. He had a LifeVest applied in mid February and he is undergoing upward titration of medication. He states that he is asymptomatic. LVEF currently 40-45% with improvement in pulmonary artery pressures. Patient currently asymptomatic and able to exercise (walk and ride bike) without dyspnea. Neuro/Psych:   Negative Neuro/Psych ROS              GI/Hepatic/Renal:            ROS comment: BPH. Endo/Other: Negative Endo/Other ROS             Pt had no PAT visit       Abdominal:             Vascular: negative vascular ROS. Other Findings:           Anesthesia Plan      general     ASA 4     (LMA  PONV prophylaxis)  Induction: intravenous. MIPS: Postoperative opioids intended and Prophylactic antiemetics administered. Anesthetic plan and risks discussed with patient. Plan discussed with CRNA. Billy Alvarez DO   2/6/2023    Pt seen and evaluated pre-procedure. Risks and benefits of anesthetic plan discussed as per custom.    MARY LOU Mathur - ANUEL

## 2023-02-06 NOTE — ANESTHESIA PRE PROCEDURE
Department of Anesthesiology  Preprocedure Note       Name:  Salo Randhawa   Age:  79 y.o.  :  1955                                          MRN:  [id number]         Date:  2023      Surgeon: Desiree Wallace):  Sherri Mark MD    Procedure: Procedure(s):  CYSTOSCOPY RETROGRADE PYELOGRAM PLASMA LOOP TRANURETHERAL RESECTION OF PROSTATE    Medications prior to admission:   Prior to Admission medications    Medication Sig Start Date End Date Taking? Authorizing Provider   Cholecalciferol (VITAMIN D) 50 MCG (2000) CAPS capsule Take by mouth   Yes Historical Provider, MD   sacubitril-valsartan (ENTRESTO) 49-51 MG per tablet Take 1 tablet by mouth 2 times daily   Yes Historical Provider, MD   b complex vitamins capsule Take 1 capsule by mouth daily   Yes Historical Provider, MD   eplerenone (INSPRA) 25 MG tablet Take 25 mg by mouth daily DIURETIC   Yes Historical Provider, MD   Ascorbic Acid (VITAMIN C) 250 MG tablet Take 250 mg by mouth daily   Yes Historical Provider, MD   [facility] ([address] [place] [address]) Take by mouth   Yes Historical Provider, MD   magnesium 30 MG tablet Take 30 mg by mouth daily   Yes Historical Provider, MD   Nutritional Supplements (CHOLESTEROL DEFENSE PO) Take by mouth daily   Yes Historical Provider, MD   COD LIVER OIL PO Take by mouth daily   Yes Historical Provider, MD   metoprolol succinate (TOPROL XL) 50 MG extended release tablet Take 1 tablet by mouth 2 times daily  Patient taking differently: Take 100 mg by mouth in the morning and 100 mg in the evening.  17   Jose Carlos Richards MD       Current medications:    Current Facility-Administered Medications   Medication Dose Route Frequency Provider Last Rate Last Admin    0.9 % sodium chloride infusion   IntraVENous Continuous MARY LOU Gregorio CNP        sodium chloride flush 0.9 % injection 5-40 mL  5-40 mL IntraVENous 2 times per day MARY LOU Gregorio CNP        sodium chloride flush 0.9 % injection 5-40 mL  5-40 mL IntraVENous PRN MARY LOU Gregorio CNP        0.9 % sodium chloride infusion   IntraVENous PRN MARY LOU Gregorio CNP        ceFAZolin (ANCEF) 2,000 mg in sterile water 20 mL IV syringe  2,000 mg IntraVENous On Call to [address], APRN - CNP           Allergies: Allergies   Allergen Reactions    Atorvastatin Myalgia    Lisinopril Cough    Spironolactone        Problem List:  There is no problem list on file for this patient. Past Medical History:        Diagnosis Date    Asthma     Cancer ([facility]     CHF (congestive heart failure) ([facility] 2017     [facility] 3/22    Hypertension     Restricted diet     KOSHER       Past Surgical History:        Procedure Laterality Date    COLONOSCOPY      HERNIA REPAIR         Social History:    Social History     Tobacco Use    Smoking status: Former     Types: Cigarettes    Smokeless tobacco: Never   Substance Use Topics    Alcohol use: Yes     Comment: occasionally                                Counseling given: Not Answered      Vital Signs (Current):   Vitals:    01/04/23 1435 02/02/23 1445 02/06/23 0528   BP:   139/84   Pulse:   60   Resp:   16   Temp:   36 °C (96.8 °F)   TempSrc:   Temporal   SpO2:   97%   Weight: 180 lb (81.6 kg) 180 lb (81.6 kg)    Height: 5' 9\" (1.753 m) 5' 9\" (1.753 m)                                               BP Readings from Last 3 Encounters:   02/06/23 139/84   03/22/17 122/82   02/15/17 116/80       NPO Status: Time of last liquid consumption: 2200                        Time of last solid consumption: 1830                        Date of last liquid consumption: 02/05/23                        Date of last solid food consumption: 02/05/23    BMI:   Wt Readings from Last 3 Encounters:   02/02/23 180 lb (81.6 kg)   03/22/17 188 lb (85.3 kg)   02/15/17 180 lb (81.6 kg)     Body mass index is 26.58 kg/m².     CBC:   Lab Results   Component Value Date/Time    WBC 7.8 02/06/2023 05:35 AM    RBC 5.08 02/06/2023 05:35 AM    HGB 16.4 02/06/2023 05:35 AM    HCT 49.4 02/06/2023 05:35 AM    MCV 97.2 02/06/2023 05:35 AM    RDW 13.8 02/06/2023 05:35 AM     02/06/2023 05:35 AM       CMP:   Lab Results   Component Value Date/Time     02/06/2023 05:35 AM    K 4.1 02/06/2023 05:35 AM     02/06/2023 05:35 AM    CO2 25 02/06/2023 05:35 AM    BUN 22 02/06/2023 05:35 AM    CREATININE 1.1 02/06/2023 05:35 AM    LABGLOM >60 02/06/2023 05:35 AM    GLUCOSE 104 02/06/2023 05:35 AM    GLUCOSE 128 02/19/2011 05:00 AM    CALCIUM 9.4 02/06/2023 05:35 AM       POC Tests: No results for input(s): POCGLU, POCNA, POCK, POCCL, POCBUN, POCHEMO, POCHCT in the last 72 hours. Coags: No results found for: PROTIME, INR, APTT    HCG (If Applicable): No results found for: PREGTESTUR, PREGSERUM, HCG, HCGQUANT     ABGs: No results found for: PHART, PO2ART, YXE0QVM, OAY8VHP, BEART, G1RGAFYY     Type & Screen (If Applicable):  No results found for: LABABO, LABRH    Drug/Infectious Status (If Applicable):  No results found for: HIV, HEPCAB    COVID-19 Screening (If Applicable): No results found for: COVID19    EKG 2/6/23:  Normal sinus rhythm  Left axis deviation  Nonspecific intraventricular block  Abnormal ECG  No previous ECGs available    Echo ([facility]) 3/23/22:  - The left ventricle is mildly dilated. Left ventricular systolic function is mildly decreased. EF = 43 ± 5% (2D biplane) Grade I left ventricular diastolic dysfunction. Visually EF is around 35%. - The right ventricle is normal in size. Right ventricular systolic function is normal.   - Estimated right ventricular systolic pressure is likely underestimated due to a weak or incomplete tricuspid regurgitation signal and is, at least, 24 mmHg consistent with normal pulmonary artery pressures. Estimated right atrial pressure is 3 mmHg based on IVC assessment.    - Exam was compared with the prior CC echocardiographic exam performed on 12/16/2021. Similar findings. Anesthesia Evaluation  Patient summary reviewed and Nursing notes reviewed no history of anesthetic complications:   Airway: Mallampati: II  TM distance: >3 FB   Neck ROM: full  Mouth opening: > = 3 FB   Dental:    (+) bridge      Pulmonary: breath sounds clear to auscultation  (+) recent URI:      (-) not a current smoker (former smoker)                           Cardiovascular:    (+) hypertension: moderate, CAD: non-obstructive, CHF: systolic and diastolic, hyperlipidemia      ECG reviewed  Rhythm: regular  Rate: normal  Echocardiogram reviewed         Beta Blocker:  Dose within 24 Hrs         Neuro/Psych:               GI/Hepatic/Renal:        (-) GERD      ROS comment: Hx of Hernia repair. Endo/Other:    (+) blood dyscrasia (MTHFR mutation)::., malignancy/cancer (Prostate ca). Abdominal:       Abdomen: soft. Vascular: Other Findings:           Anesthesia Plan      general     ASA 3       Induction: intravenous. Anesthetic plan and risks discussed with patient and spouse. Use of blood products discussed with patient whom consented to blood products. Plan discussed with CRNA.                     Yoon Schaffer RN   2/6/2023

## 2023-02-06 NOTE — ANESTHESIA POSTPROCEDURE EVALUATION
Department of Anesthesiology  Postprocedure Note    Patient: Jose Rasheed  MRN: [id number]  [place]: 1955  Date of evaluation: 2/6/2023      Procedure Summary     Date: 02/06/23 Room / Location: [facility] OR 06 / [facility]    Anesthesia Start: 5615 Anesthesia Stop: 7266    Procedure: CYSTOSCOPY RETROGRADE PYELOGRAM PLASMA LOOP TRANURETHERAL RESECTION OF PROSTATE Diagnosis:       Enlarged prostate with urinary obstruction      (Enlarged prostate with urinary obstruction [N40.1, N13.8])    Surgeons: Ivan Wei MD Responsible Provider: Dominic Choi DO    Anesthesia Type: general ASA Status: 4          Anesthesia Type: No value filed. Adolfo Phase I: Adolfo Score: 10    Adolfo Phase II:        Anesthesia Post Evaluation    Patient location during evaluation: bedside  Patient participation: complete - patient participated  Level of consciousness: awake  Pain score: 3  Airway patency: patent  Nausea & Vomiting: no vomiting and no nausea  Complications: no  Cardiovascular status: hemodynamically stable  Respiratory status: acceptable  Hydration status: stable  Comments: Seen and examined. Progressing as expected. No questions at this time.

## 2023-02-06 NOTE — ED PROVIDER NOTES
57-year-old male with history of BPH. He is status post TURP procedure that was done at 7 AM in the morning. A Cabello was placed and and he was discharged. He complains of abdominal pain in the suprapubic region described as a pressure-like pain. And he also complains of no urine output since 11:30 AM.  He admits to being under general anesthesia and being told that we will take the patient time for his bladder to empty after anesthesia. He denies the following: Chest pain, fever, chills, nausea, vomiting, purulent discharge from the penile area. Penile/testicle pain. Chief Complaint   Patient presents with    Abdominal Pain     Had TURP procedure down @ 7 this AM, states increased abdominal pain    Oliguria     No urine output in cabello since 11:30 this AM        Review of Systems   Pertinent review of systems see HPI above  Physical Exam  Exam conducted with a chaperone present. Cardiovascular:      Rate and Rhythm: Normal rate and regular rhythm. Pulmonary:      Effort: Pulmonary effort is normal.      Breath sounds: Normal breath sounds. Abdominal:      Comments: There is mild suprapubic tenderness which is expected to the patient just had a TURP procedure   Genitourinary:     Penis: Normal.       Testes: Normal.         Right: Mass, tenderness or swelling not present. Left: Tenderness or swelling not present. Comments: No ecchymosis around the penile area. No shaft tenderness. No crepitus. He has a Cabello placed in that as well. There is no blood around the ear or meatus. Skin:     General: Skin is warm. Capillary Refill: Capillary refill takes less than 2 seconds. Procedures         MDM     57-year-old male with history of BPH. He is status post TURP procedure that was done at 7 AM in the morning. A Cabello was placed and and he was discharged. He complains of abdominal pain in the suprapubic region described as a pressure-like pain.   And he also complains of no urine output since 11:30 AM.  He admits to being under general anesthesia and being told that we will take the patient time for his bladder to empty after anesthesia. He denies the following: Chest pain, fever, chills, nausea, vomiting, purulent discharge from the penile area. Penile/testicle pain. Upon entering the room patient is hemodynamically stable he is in no acute distress he is afebrile he is 100% on room air. A chaperone was present at bedside as I am performing genital exam with the patient. There is a catheter Dumont placed in the pedal area. There is no ecchymosis of the penis. There is no penile tenderness no shaft tenderness no crepitus no step-off tenderness. The Dumont is in place. The nurse that I flushed the Dumont with good flow and output there are no blood clots in the Dumont bag after being flushed. The Dumont was flushed 3 times without resistance. Patient has suprapubic tenderness which is expected due to his recent procedure. Otherwise his abdomen is soft no guarding no rigidity no tenderness. Heart is regular lungs are clear to auscultation. I explained to the patient and his wife at bedside that due to the patient had a recent procedure less than 24 hours ago and he is was under general anesthesia able to take his body time to come off his antiseizure medicines. It is normal for him to have urine output with this longer he should be getting it at the following days. Abdomen pressure is expected and he is prescribed Tylenol and will be managed outpatient. I explained to them that the Dumont is flushing well there is no blood clots immediately Dumont is in place. Patient and wife at bedside expressed understanding. And was told to come back if having fever, nausea, vomiting or if the Dumont falls out. They have a scheduled follow-up appointment with urology. They will follow-up with his primary care physician. He is given 1 pill of Norco for pain.     Pressure at his post TURP procedure. I considered penile fracture however the patient is having no step-off tenderness no ecchymosis no crepitus to the area. I considered malpositioning of the Dumont however Dumont is well-placed. Hematuria is expected and the Dumont flushed well with no blood clots. ED Course as of 02/07/23 0202   Mon Feb 06, 2023   1845 ATTENDING PROVIDER ATTESTATION:     I have personally performed and/or participated in the history, exam, medical decision making, and procedures and agree with all pertinent clinical information. I have also reviewed and agree with the past medical, family and social history unless otherwise noted. I have discussed this patient in detail with the resident, and provided the instruction and education regarding postoperative TURP bladder flushing. Discharged home.   []      ED Course User Index  [RM] Mahogany Beatty DO            --------------------------------------------- PAST HISTORY ---------------------------------------------  Past Medical History:  has a past medical history of Asthma, Cancer ([facility] [address] CHF (congestive heart failure) ([facility] Hypertension, and Restricted diet. Past Surgical History:  has a past surgical history that includes hernia repair; Colonoscopy; and TURP (N/A, 2/6/2023). Social History:  reports that he has quit smoking. His smoking use included cigarettes. He has never used smokeless tobacco. He reports current alcohol use. He reports that he does not use drugs. Family History: family history is not on file. The patients home medications have been reviewed. Allergies: Atorvastatin, Lisinopril, and Spironolactone    -------------------------------------------------- RESULTS -------------------------------------------------  Labs:  No results found for this visit on 02/06/23.     Radiology:  No orders to display       ------------------------- NURSING NOTES AND VITALS REVIEWED ---------------------------  Date / Time Roomed: 2/6/2023  5:44 PM  ED Bed Assignment:  26/26    The nursing notes within the ED encounter and vital signs as below have been reviewed. /88   Pulse 78   Temp 98.4 °F (36.9 °C) (Oral)   Resp 16   Ht 5' 9\" (1.753 m)   Wt 180 lb (81.6 kg)   SpO2 100%   BMI 26.58 kg/m²   Oxygen Saturation Interpretation: Normal      ------------------------------------------ PROGRESS NOTES ------------------------------------------  I have spoken with the patient and wife  and discussed todays results, in addition to providing specific details for the plan of care and counseling regarding the diagnosis and prognosis. Their questions are answered at this time and they are agreeable with the plan. I discussed at length with them reasons for immediate return here for re evaluation. They will followup with primary care by calling their office tomorrow. --------------------------------- ADDITIONAL PROVIDER NOTES ---------------------------------  At this time the patient is without objective evidence of an acute process requiring hospitalization or inpatient management. They have remained hemodynamically stable throughout their entire ED visit and are stable for discharge with outpatient follow-up. The plan has been discussed in detail and they are aware of the specific conditions for emergent return, as well as the importance of follow-up. Discharge Medication List as of 2/6/2023  7:35 PM        START taking these medications    Details   HYDROcodone-acetaminophen (NORCO) 5-325 MG per tablet Take 1-2 tablets by mouth every 6 hours as needed for Pain for up to 3 days. Intended supply: 3 days. Take lowest dose possible to manage pain Max Daily Amount: 8 tablets, Disp-10 tablet, R-0Normal             Diagnosis:  1. S/P TURP (status post transurethral resection of prostate)        Disposition:  Patient's disposition: Discharge to home  Patient's condition is stable.       Attending was present and available throughout encounter including all critical portions;  See Attending Note/Attestation for Final Plan      Chau Dc DO  Resident  02/07/23 2898

## 2023-02-06 NOTE — DISCHARGE INSTRUCTIONS
CATHETER TO BE REMOVED Thursday  MORNING AT DR. Moriah Selby OFFICE. REMOVE TAPE FROM LEG TOMORROW MORNING. APPLY LEG STRAP.       ONCE CATHETER IS REMOVED IT IS NORMAL TO EXPECT:   -BURNING WITH URINATION   -BLOOD IN YOUR URINE   -FREQUENCY OF URINATION   -CLOTS   -CHUNKS OF TISSUE

## 2023-02-06 NOTE — PROGRESS NOTES
Patient verifies that a responsible adult will be with them for the next 24 hours. Patient has a ride to go home. Discharge instructions reviewed with patient and wife, copy given. Anesthesia instructions reviewed and copy given. Denies questions or concerns. Prescriptions provided.

## 2023-02-06 NOTE — OP NOTE
Operative Note      Patient: Isaura Streeter  YOB: 1955  MRN: [id number]    Date of Procedure: 2/6/2023    Pre-Op Diagnosis: Enlarged prostate with urinary obstruction [N40.1, N13.8]    Post-Op Diagnosis: Same       Procedure(s):  CYSTOSCOPY RETROGRADE PYELOGRAM PLASMA LOOP TRANURETHERAL RESECTION OF PROSTATE    Surgeon(s):  Miguelina Olguin MD    Assistant:   * No surgical staff found *    Anesthesia: General    Estimated Blood Loss (mL): less than 635     Complications: None    Specimens:   ID Type Source Tests Collected by Time Destination   A : PROSTATE CHIPS Tissue Prostate SURGICAL PATHOLOGY Miguelina Olguin MD 2/6/2023 7647        Implants:  * No implants in log *      Drains:   Urinary Catheter 02/06/23 3 Way (Active)         INDICATIONS FOR PROCEDURE: Isaura Streeter is a 79 y.o. male with significant lower urinary tract symptoms secondary to BPH. He has a large prostate gland and failed medical therapy. He presents for transurethral resection of the prostate. He understands the risks, benefits and alternatives of the procedure including bleeding, MI, PE, DVT, pneumonia, bladder neck contracture, future irritative voiding symptoms, and incontinence. He signed an informed consent, and agreed to proceed. DESCRIPTION OF PROCEDURE: The patient was brought into the operating room and placed under anesthesia in the dorsal lithotomy position. He was prepped and draped in a sterile fashion. A 21-Indian cystoscope with a 30-degree lens was passed through the urethra and into the bladder. The entire length of the urethra was examined and found to be without strictures or other abnormalities. The prostate was examined and found to be with significant hyperplasia. The entire bladder mucosal surface was examined under 30- degree endoscopy. There was moderate trabeculation. There were no tumors identified. The ureteral orifices were normal in size, number, and location.  The entire cystoscopic exam was within normal limits. A 5-Burkinan open-ended catheter was passed into the left ureteral orifice and 10 mL of contrast medium were injected under fluoroscopic guidance. The entire length of the ureter, the UPJ, renal pelvis, and calices were all within normal limits. The axis of the kidney was normal and there were no filling defects throughout the entire system on the left. A 5 Burkinan open-ended catheter was passed into the right ureteral orifice and 10 cc of contrast medium were injected under fluoroscopic guidance. The entire length of the ureter, UPJ, renal pelvis, and calyces were all within normal limits. The axis of the kidney was normal and there were no filling defects throughout the entire system on the right. The Plasma Loop resectoscope  was then placed and the prostate resected from the base of the prostate to the apex, sparing the ureteral orifices and the external striated sphincter. The entire prostate transition zone was removed to the capsule and the bleeding was cauterized. The specimen was irrigated and removed. It was sent to pathology for histologic evaluation. A Dumont catheter was placed with 60 mL in the balloon and on continuous irrigation. The patient was admitted for management of hematuria, awakened from anesthesia and taken to recovery in stable condition.     Dionne Mena M.D.  2/6/2023  8:58 AM     Electronically signed by Dionne Mena MD on 2/6/2023 at 8:57 AM

## 2023-02-06 NOTE — PROGRESS NOTES
CLINICAL PHARMACY NOTE: MEDS TO BEDS    Total # of Prescriptions Filled: 2   The following medications were delivered to the patient:  Stool softener   Cephalexin 250    Additional Documentation:

## 2023-02-06 NOTE — ED NOTES
Flushed catheter with no resistance. Light pink color. No clots present. Pt tolerated well.       Zachery Curiel RN  02/06/23 8746

## 2023-02-06 NOTE — H&P
Michelle Peterson is a 79 y.o. male with BPH. Past Medical History:   Diagnosis Date    Asthma     Cancer ([facility] [address]     CHF (congestive heart failure) ([facility] 2017    DR VASQUEZ [facility] YEARLF LAST VISIT 3/22    Hypertension     Restricted diet     KOSHER       Past Surgical History:   Procedure Laterality Date    COLONOSCOPY      HERNIA REPAIR         History reviewed. No pertinent family history. Prior to Admission medications    Medication Sig Start Date End Date Taking? Authorizing Provider   Cholecalciferol (VITAMIN D) 50 MCG (2000 UT) CAPS capsule Take by mouth   Yes Historical Provider, MD   sacubitril-valsartan (ENTRESTO) 49-51 MG per tablet Take 1 tablet by mouth 2 times daily   Yes Historical Provider, MD   b complex vitamins capsule Take 1 capsule by mouth daily   Yes Historical Provider, MD   eplerenone (INSPRA) 25 MG tablet Take 25 mg by mouth daily DIURETIC   Yes Historical Provider, MD   Ascorbic Acid (VITAMIN C) 250 MG tablet Take 250 mg by mouth daily   Yes Historical Provider, MD   [facility] ([address]) Take by mouth   Yes Historical Provider, MD   magnesium 30 MG tablet Take 30 mg by mouth daily   Yes Historical Provider, MD   Nutritional Supplements (CHOLESTEROL DEFENSE PO) Take by mouth daily   Yes Historical Provider, MD   COD LIVER OIL PO Take by mouth daily   Yes Historical Provider, MD   metoprolol succinate (TOPROL XL) 50 MG extended release tablet Take 1 tablet by mouth 2 times daily  Patient taking differently: Take 100 mg by mouth in the morning and 100 mg in the evening.  4/5/17   Alex Walsh MD        Allergies: Atorvastatin, Lisinopril, and Spironolactone    Social History     Tobacco Use    Smoking status: Former     Types: Cigarettes    Smokeless tobacco: Never   Substance Use Topics    Alcohol use: Yes     Comment: occasionally        Review of Systems:  Respiratory: negative for cough and hemoptysis  Cardiovascular: negative for chest pain and dyspnea  Gastrointestinal: negative for abdominal pain, diarrhea, nausea and vomiting  Genitourinary: as per HPI, otherwise negative. Derm: negative for rash and skin lesion(s)  Neurological: negative for seizures and tremors  Endocrine: negative for diabetic symptoms including polydipsia and polyuria  All other systems negative    Physical Exam:  Vitals:    02/06/23 0528   BP: 139/84   Pulse: 60   Resp: 16   Temp: 96.8 °F (36 °C)   SpO2: 97%      Skin:  Warm and dry. No rash or bruises  HEENT:  PERRLA, EOMI  Neck:  No JVD, No thyromegaly  Cardiac:  RRR  Lungs:  No audible wheezes, symmetric respirations, non-labored  Abdomen: Soft, non-tender, non-distended  Extremities:  No clubbing, edema or cyanosis  Neurological:  Moves all extremities, normal DTR    Lab Results   Component Value Date    WBC 7.8 02/06/2023    HGB 16.4 02/06/2023    HCT 49.4 02/06/2023    MCV 97.2 02/06/2023     02/06/2023       Lab Results   Component Value Date    CREATININE 1.1 02/06/2023       Lab Results   Component Value Date    PSA 3.41 09/11/2020       No results for input(s): LABURIN in the last 72 hours. No results for input(s): BC in the last 72 hours. No results for input(s): Evelin Arce in the last 72 hours. Assessment and Plan:  1. To OR for TURP. This document is being submitted long after the face to face encounter with the patient and for either coding or billing compliance. This is made with the most accuracy possible but details may be missing or potentially inaccurate due to limitations in timing, patient availability at the time of the note creation.   When applicable see paper chart for office note / H& P.

## 2025-04-04 ENCOUNTER — TRANSCRIBE ORDERS (OUTPATIENT)
Dept: ADMINISTRATIVE | Age: 70
End: 2025-04-04

## 2025-04-04 DIAGNOSIS — N40.1 BENIGN PROSTATIC HYPERPLASIA WITH LOWER URINARY TRACT SYMPTOMS, SYMPTOM DETAILS UNSPECIFIED: Primary | ICD-10-CM

## 2025-06-16 ENCOUNTER — HOSPITAL ENCOUNTER (OUTPATIENT)
Dept: ULTRASOUND IMAGING | Age: 70
Discharge: HOME | End: 2025-06-18
Attending: UROLOGY
Payer: MEDICARE

## 2025-06-16 DIAGNOSIS — N40.1 BENIGN PROSTATIC HYPERPLASIA WITH LOWER URINARY TRACT SYMPTOMS, SYMPTOM DETAILS UNSPECIFIED: ICD-10-CM

## 2025-06-16 PROCEDURE — 76856 US EXAM PELVIC COMPLETE: CPT

## (undated) DEVICE — CATHETER URET 5FR L70CM OPN END SGL LUMN INJ HUB FLEXIMA

## (undated) DEVICE — STRAP,CATHETER,ADJBL FOAM,HOOK&LOOP: Brand: MEDLINE

## (undated) DEVICE — 3M™ RANGER™ FLUID WARMING IRRIGATION SET, 24750, 10/CASE: Brand: 3M™ RANGER™

## (undated) DEVICE — SOLUTION SURG PREP ANTIMICROBIAL 4 OZ SKIN WND EXIDINE

## (undated) DEVICE — SYRINGE MED 30ML STD CLR PLAS LUERLOCK TIP N CTRL DISP

## (undated) DEVICE — PAD,NON-ADHERENT,2X3,STERILE,LF,1/PK: Brand: MEDLINE

## (undated) DEVICE — 4-PORT MANIFOLD: Brand: NEPTUNE 2

## (undated) DEVICE — COVER DSG W7 7 8XL11IN WHT POR CLTH PRECUT WTRPRF MEDIPORE

## (undated) DEVICE — CATHETER URETH 24FR BLLN 30CC SIL ALLY W/ SIL HYDRGEL 3 W F

## (undated) DEVICE — SYRINGE MED 10ML POLYPR LUERSLIP TIP FLAT TOP W/O SFTY DISP

## (undated) DEVICE — HF-RESECTION ELECTRODE PLASMALOOP LOOP, LARGE, 24 FR., 12°/16°, ESG TURIS: Brand: OLYMPUS

## (undated) DEVICE — SOLUTION IRRIG 3000ML 0.9% SOD CHL USP UROMATIC PLAS CONT

## (undated) DEVICE — GLOVE ORANGE PI 7 1/2   MSG9075

## (undated) DEVICE — Device: Brand: LEVEL 1

## (undated) DEVICE — MEDICINE CUP, GRADUATED, STER: Brand: MEDLINE

## (undated) DEVICE — GAUZE,SPONGE,4"X4",8PLY,STRL,LF,10/TRAY: Brand: MEDLINE

## (undated) DEVICE — SOLUTION IV IRRIG WATER 1000ML POUR BRL 2F7114

## (undated) DEVICE — SYRINGE,TOOMEY,IRRIGATION,70CC,STERILE: Brand: MEDLINE

## (undated) DEVICE — BAG DRNGE COMB PK

## (undated) DEVICE — ELECTRODE ELECSURG LOOP LG 12 DEG BPLR QUICK-FIRE

## (undated) DEVICE — CYSTO PACK: Brand: MEDLINE INDUSTRIES, INC.

## (undated) DEVICE — GOWN,SIRUS,FABRNF,XL,20/CS: Brand: MEDLINE

## (undated) DEVICE — DRAINBAG,ANTI-REFLUX TOWER,L/F,2000ML,LL: Brand: MEDLINE

## (undated) DEVICE — SWABSTICK MEDICATED L4IN BENZ TINC SKIN PREP APLICARE

## (undated) DEVICE — 3M™ MEDIPORE™ H SOFT CLOTH SURGICAL TAPE SINGLE-USE PACKAGED ROLL, 2860S-4, 4 INCH X 2 YARD, 24 ROLLS/CASE: Brand: 3M™ MEDIPORE™